# Patient Record
Sex: MALE | Race: WHITE | NOT HISPANIC OR LATINO
[De-identification: names, ages, dates, MRNs, and addresses within clinical notes are randomized per-mention and may not be internally consistent; named-entity substitution may affect disease eponyms.]

---

## 2020-01-06 PROBLEM — Z00.00 ENCOUNTER FOR PREVENTIVE HEALTH EXAMINATION: Status: ACTIVE | Noted: 2020-01-06

## 2020-03-16 ENCOUNTER — TRANSCRIPTION ENCOUNTER (OUTPATIENT)
Age: 35
End: 2020-03-16

## 2020-09-05 ENCOUNTER — EMERGENCY (EMERGENCY)
Facility: HOSPITAL | Age: 35
LOS: 1 days | Discharge: HOME | End: 2020-09-05
Attending: EMERGENCY MEDICINE | Admitting: EMERGENCY MEDICINE
Payer: MEDICAID

## 2020-09-05 VITALS
RESPIRATION RATE: 18 BRPM | DIASTOLIC BLOOD PRESSURE: 77 MMHG | OXYGEN SATURATION: 98 % | SYSTOLIC BLOOD PRESSURE: 136 MMHG | HEIGHT: 69 IN | HEART RATE: 100 BPM | WEIGHT: 304.9 LBS | TEMPERATURE: 99 F

## 2020-09-05 PROCEDURE — 12002 RPR S/N/AX/GEN/TRNK2.6-7.5CM: CPT

## 2020-09-05 PROCEDURE — 73110 X-RAY EXAM OF WRIST: CPT

## 2020-09-05 PROCEDURE — 99283 EMERGENCY DEPT VISIT LOW MDM: CPT

## 2020-09-05 PROCEDURE — 73130 X-RAY EXAM OF HAND: CPT

## 2020-09-05 PROCEDURE — 73130 X-RAY EXAM OF HAND: CPT | Mod: 26,LT

## 2020-09-05 PROCEDURE — 99284 EMERGENCY DEPT VISIT MOD MDM: CPT | Mod: 25

## 2020-09-05 PROCEDURE — 73110 X-RAY EXAM OF WRIST: CPT | Mod: 26,LT

## 2020-09-05 RX ORDER — IBUPROFEN 200 MG
1 TABLET ORAL
Qty: 9 | Refills: 0
Start: 2020-09-05 | End: 2020-09-07

## 2020-09-05 RX ORDER — CEPHALEXIN 500 MG
1 CAPSULE ORAL
Qty: 28 | Refills: 0
Start: 2020-09-05 | End: 2020-09-11

## 2020-09-05 RX ORDER — ACETAMINOPHEN 500 MG
650 TABLET ORAL ONCE
Refills: 0 | Status: COMPLETED | OUTPATIENT
Start: 2020-09-05 | End: 2020-09-05

## 2020-09-05 RX ORDER — ACETAMINOPHEN 500 MG
2 TABLET ORAL
Qty: 30 | Refills: 0
Start: 2020-09-05 | End: 2020-09-09

## 2020-09-05 NOTE — ED PROVIDER NOTE - ATTENDING CONTRIBUTION TO CARE
34 yo M p/w cut L volar hand with a broken dog bowl today - now with  tingling to 4th digit, unable to flex 4th digit. no other numbness. no other inj . No redness / swelling. No FB sens. No other acute co.  exam: MM moist. neck supple . non-toxic, well appearing.  L hand with 4cm lac across volar aspect of dist hand , no str - unable to flex 4th digit. dec sens c.w other fingers as well. FROM with nl str in other isolated digits.   Check xr, Hand consult for tendon laceration

## 2020-09-05 NOTE — ED PROVIDER NOTE - NSFOLLOWUPINSTRUCTIONS_ED_ALL_ED_FT
Follow up with Dr. Ng as soon as possible. Keflex (antibiotic) was sent to your pharmacy. Continue to keep splint in place. Discussed with Dr. Ng possibility of surgical intervention.     A laceration is a cut that goes through all of the layers of the skin and into the tissue that is right under the skin. Some lacerations heal on their own. Others need to be closed with skin adhesive strips, skin glue, stitches (sutures), or staples. Proper laceration care minimizes the risk of infection and helps the laceration to heal better.  If non-absorbable stitches or staples have been placed, they must be taken out within the time frame instructed by your healthcare provider.    SEEK IMMEDIATE MEDICAL CARE IF YOU HAVE ANY OF THE FOLLOWING SYMPTOMS: swelling around the wound, worsening pain, drainage from the wound, red streaking going away from your wound, inability to move finger or toe near the laceration, or discoloration of skin near the laceration.

## 2020-09-05 NOTE — ED PROVIDER NOTE - OBJECTIVE STATEMENT
34 y/o right handed male without reported PMHx presents today c/o laceration to left hand PTA. pt reports he was handling his dog bowl in which it broke and cut his hand. pt notes pain to left hand, non-radiating, and currently 8/10. Pt reports limited sensation and ROM of his left 4th digit. pt reports he is UTD with tetanus. pt denies head injury, drug use, fever, discharge, foreign body, or any other complaints.
Detail Level: Detailed

## 2020-09-05 NOTE — ED ADULT NURSE NOTE - OBJECTIVE STATEMENT
received pt in bed #t2 sleeping Pt A&O w/ laceration to left hand States was cleaning dog bowl & it broke. Pt states he can not move left 4th finger Pt seen by AMIRA Vilchis & Dr Biswas Will contact hand

## 2020-09-05 NOTE — CONSULT NOTE ADULT - ASSESSMENT
Plan: 35 year old male with zone II/III flexor tendon injury of the 4th phalanx  - No acute surgical intervention needed.   - Bedside laceration repair preformed  - Tetanus up to date  - Patient placed in aluminum foil splint, please keep clean and dry  - Pain control  - Discussed likely need for surgical intervention  - Follow up with Dr. Ng in 1 week for further evaluation  - Discussed with attending who agrees with above plan.  - Orthopedic stable for discharge

## 2020-09-05 NOTE — ED PROVIDER NOTE - PHYSICAL EXAMINATION
Constitutional: Awake, Alert, non-toxic. NAD. Well appearing, well nourished. Sleeping prior to assessment   HEAD: Normocephalic, atraumatic.   EYES: EOM intact, conjunctiva and sclera are clear bilaterally.   ENT: No rhinorrhea, patent, mucous membranes pink/moist, no drooling or stridor.   NECK: Supple, non-tender  RESPIRATORY: Normal respiratory effort  EXTREMITIES: Full passive and active ROM in all extremities; non-tender to palpation; distal pulses palpable and symmetric  SKIN: Warm, dry; good skin turgor, (+) 3.5 cm laceration of left palm overlying 4th MC, flexion and extension of 4th digit not intact, no ecchymosis, brisk capillary refill.  NEURO: A&O x3. Speech is normal. decreased sensation of 4th digit,

## 2020-09-05 NOTE — ED ADULT NURSE REASSESSMENT NOTE - NS ED NURSE REASSESS COMMENT FT1
pt continues to threaten staff "ill see you all soon I have your names!"    Security called pt continues to curse & scream @ staff Pt escorted off property

## 2020-09-05 NOTE — ED ADULT NURSE REASSESSMENT NOTE - NS ED NURSE REASSESS COMMENT FT1
pt ready to be discharged demanding percocet & gabapentin Screaming & cursing @ staff. Multiple staff members spoke w/ pt  (PA's, MD's & nurses) Charge nurse made aware

## 2020-09-05 NOTE — ED PROVIDER NOTE - PATIENT PORTAL LINK FT
You can access the FollowMyHealth Patient Portal offered by [facility] by registering at the following website: [email or web address]. By joining [employer]’s FollowMyHealth portal, you will also be able to view your health information using other applications (apps) compatible with our system.

## 2020-09-05 NOTE — ED ADULT NURSE REASSESSMENT NOTE - NS ED NURSE REASSESS COMMENT FT1
pt screaming & cursing at staff Charge nurse made aware Pt refusing tylenol "I need something lashawn than that!"

## 2020-09-05 NOTE — CONSULT NOTE ADULT - SUBJECTIVE AND OBJECTIVE BOX
Patient is a 35 year old male with no significant past medical history who presents with a hand laceration after a bowl broke in his hands earlier this afternoon. Patient has a visible laceration over the palmar, ulnar aspect of his left hand and is complaining of inability to flex his ring finger. Patient is also complaining of numbness on the radial aspect of his ring finger. Patient denies pain elsewhere. Denies additional injuries. Patient states his last tetanus shot was 5 years ago.     Vital Signs Last 24 Hrs  T(C): 37.1 (05 Sep 2020 18:28), Max: 37.1 (05 Sep 2020 18:28)  T(F): 98.8 (05 Sep 2020 18:28), Max: 98.8 (05 Sep 2020 18:28)  HR: 100 (05 Sep 2020 18:28) (100 - 100)  BP: 136/77 (05 Sep 2020 18:28) (136/77 - 136/77)  BP(mean): --  RR: 18 (05 Sep 2020 18:28) (18 - 18)  SpO2: 98% (05 Sep 2020 18:28) (98% - 98%)    Physical Exam:  Gen: in mild distress  LUE:  - approximately 7 cm laceration over the palmar, ulnar aspect of the hand extending to the base of the 5 proximal phalanx  - unable to flex 4th proximal, middle, and distal phalanx   - no sensation over the radial aspect of his 4th phalanx  - + AIN/PIN,Radial, Ulnar, Median N  - SILT   - + pulses    Secondary Assessment:  NC/AT, NTTP of clavicles, NTTP of C-,T-,L-Spine, NTTP of Pelvis  UEs: NTTP of Shoulders, Elbows, Wrists, Hands; NT with AROM/PROM of Shoulders, Elbows, Wrists, Hands; AIN/PIN/Med/Uln/Msc/Rad/Ax intact  LEs: Able to SLR, NT with Log Roll, NT with Heel Strike, NTTP of Hips, Knees, Ankles, Feet; NT with AROM/PROM of Hips, Knees, Ankles, Feet; Q/H/Gsc/TA/EHL/FHL intact    Procedure: Procedure explained in detail to patient at bedside. Pt expressed understanding and all questions were answered. Consent for procedure was verbally obtained. Under sterile technique, 10cc 1% lidocaine were injected into the surrounding tissue with adequate anesthesia obtained and the laceration was closed with 7 3.0 nylon stiches. Patient was placed in an aluminum foil splint with krillex. Patient tolerated the procedure well.    Imaging:  XR L Wrist/Hand: neg for fracture/foreign body

## 2020-09-05 NOTE — ED PROVIDER NOTE - CARE PROVIDER_API CALL
He Ng  ORTHOPAEDIC SURGERY  [address] [place] [address]  Phone: [phone]  Fax: [phone]  Follow Up Time: 1-3 Days

## 2020-09-05 NOTE — ED PROVIDER NOTE - CLINICAL SUMMARY MEDICAL DECISION MAKING FREE TEXT BOX
c/o laceration to left hand PTA. pt reports he was handling his dog bowl in which it broke and cut his hand. Exam noted decreased ROM and sensation of 4th digit. Plan includes xray and hand consult. Dr. Ng on for hand, ortho paged.

## 2020-09-05 NOTE — ED PROVIDER NOTE - PROGRESS NOTE DETAILS
ortho/hand repaired laceration and applied splint. Recommended follow up outpatient with Dr. augustin for likely surgical intervention. Recommended dc with Keflex. Pt is cursing and screaming at staff. pt is very aggressive. pt initially was sleeping but noting 10/10 pain. upon discharge patient requesting narcotics and gabapentin. Rxed Ibuprofen, pt now notes he has an ibuprofen allergy. Discussed with switch to Tylenol Extra strength. pt reports orthopedic advised they will prescribe him Percocet and Gabapentin. As per ortho this was not said to patient and he initially noted he doesn't want narcotics.

## 2020-09-10 ENCOUNTER — APPOINTMENT (OUTPATIENT)
Dept: ORTHOPEDIC SURGERY | Facility: CLINIC | Age: 35
End: 2020-09-10

## 2020-09-10 ENCOUNTER — APPOINTMENT (OUTPATIENT)
Dept: ORTHOPEDIC SURGERY | Facility: CLINIC | Age: 35
End: 2020-09-10
Payer: MEDICAID

## 2020-09-10 VITALS
HEART RATE: 90 BPM | SYSTOLIC BLOOD PRESSURE: 126 MMHG | WEIGHT: 290 LBS | OXYGEN SATURATION: 93 % | DIASTOLIC BLOOD PRESSURE: 80 MMHG | BODY MASS INDEX: 42.95 KG/M2 | HEIGHT: 69 IN

## 2020-09-10 DIAGNOSIS — F17.200 NICOTINE DEPENDENCE, UNSPECIFIED, UNCOMPLICATED: ICD-10-CM

## 2020-09-10 DIAGNOSIS — Z78.9 OTHER SPECIFIED HEALTH STATUS: ICD-10-CM

## 2020-09-10 DIAGNOSIS — Z83.3 FAMILY HISTORY OF DIABETES MELLITUS: ICD-10-CM

## 2020-09-10 PROCEDURE — 99203 OFFICE O/P NEW LOW 30 MIN: CPT

## 2020-09-10 RX ORDER — DEXAMETHASONE 4 MG/1
TABLET ORAL
Refills: 0 | Status: ACTIVE | COMMUNITY

## 2020-09-10 RX ORDER — GABAPENTIN 300 MG
300 TABLET ORAL
Refills: 0 | Status: ACTIVE | COMMUNITY

## 2020-09-14 DIAGNOSIS — Z01.818 ENCOUNTER FOR OTHER PREPROCEDURAL EXAMINATION: ICD-10-CM

## 2020-09-15 ENCOUNTER — APPOINTMENT (OUTPATIENT)
Dept: DISASTER EMERGENCY | Facility: CLINIC | Age: 35
End: 2020-09-15

## 2020-09-15 RX ORDER — TRAMADOL HYDROCHLORIDE 50 MG/1
50 TABLET, COATED ORAL 3 TIMES DAILY
Qty: 21 | Refills: 0 | Status: ACTIVE | COMMUNITY
Start: 2020-09-15 | End: 1900-01-01

## 2020-09-15 RX ORDER — GABAPENTIN 300 MG/1
300 CAPSULE ORAL
Qty: 30 | Refills: 1 | Status: ACTIVE | COMMUNITY
Start: 2020-09-15 | End: 1900-01-01

## 2020-09-16 ENCOUNTER — OUTPATIENT (OUTPATIENT)
Dept: OUTPATIENT SERVICES | Facility: HOSPITAL | Age: 35
LOS: 1 days | End: 2020-09-16
Payer: MEDICAID

## 2020-09-16 VITALS
SYSTOLIC BLOOD PRESSURE: 131 MMHG | WEIGHT: 298.95 LBS | HEART RATE: 110 BPM | HEIGHT: 69 IN | TEMPERATURE: 98 F | RESPIRATION RATE: 16 BRPM | OXYGEN SATURATION: 98 % | DIASTOLIC BLOOD PRESSURE: 77 MMHG

## 2020-09-16 DIAGNOSIS — Z98.818 OTHER DENTAL PROCEDURE STATUS: Chronic | ICD-10-CM

## 2020-09-16 DIAGNOSIS — S61.412A LACERATION WITHOUT FOREIGN BODY OF LEFT HAND, INITIAL ENCOUNTER: ICD-10-CM

## 2020-09-16 DIAGNOSIS — Z98.890 OTHER SPECIFIED POSTPROCEDURAL STATES: Chronic | ICD-10-CM

## 2020-09-16 DIAGNOSIS — F11.20 OPIOID DEPENDENCE, UNCOMPLICATED: ICD-10-CM

## 2020-09-16 DIAGNOSIS — S66.022D LACERATION OF LONG FLEXOR MUSCLE, FASCIA AND TENDON OF LEFT THUMB AT WRIST AND HAND LEVEL, SUBSEQUENT ENCOUNTER: ICD-10-CM

## 2020-09-16 DIAGNOSIS — Z01.818 ENCOUNTER FOR OTHER PREPROCEDURAL EXAMINATION: ICD-10-CM

## 2020-09-16 LAB
HCT VFR BLD CALC: 41 % — SIGNIFICANT CHANGE UP (ref 39–50)
HGB BLD-MCNC: 13 G/DL — SIGNIFICANT CHANGE UP (ref 13–17)
MCHC RBC-ENTMCNC: 28.8 PG — SIGNIFICANT CHANGE UP (ref 27–34)
MCHC RBC-ENTMCNC: 31.7 GM/DL — LOW (ref 32–36)
MCV RBC AUTO: 90.9 FL — SIGNIFICANT CHANGE UP (ref 80–100)
NRBC # BLD: 0 /100 WBCS — SIGNIFICANT CHANGE UP (ref 0–0)
PLATELET # BLD AUTO: 333 K/UL — SIGNIFICANT CHANGE UP (ref 150–400)
RBC # BLD: 4.51 M/UL — SIGNIFICANT CHANGE UP (ref 4.2–5.8)
RBC # FLD: 15 % — HIGH (ref 10.3–14.5)
SARS-COV-2 N GENE NPH QL NAA+PROBE: NOT DETECTED
WBC # BLD: 9.12 K/UL — SIGNIFICANT CHANGE UP (ref 3.8–10.5)
WBC # FLD AUTO: 9.12 K/UL — SIGNIFICANT CHANGE UP (ref 3.8–10.5)

## 2020-09-16 PROCEDURE — 85027 COMPLETE CBC AUTOMATED: CPT

## 2020-09-16 PROCEDURE — G0463: CPT

## 2020-09-16 RX ORDER — SODIUM CHLORIDE 9 MG/ML
3 INJECTION INTRAMUSCULAR; INTRAVENOUS; SUBCUTANEOUS EVERY 8 HOURS
Refills: 0 | Status: DISCONTINUED | OUTPATIENT
Start: 2020-09-18 | End: 2020-10-02

## 2020-09-16 RX ORDER — CHLORHEXIDINE GLUCONATE 213 G/1000ML
1 SOLUTION TOPICAL ONCE
Refills: 0 | Status: DISCONTINUED | OUTPATIENT
Start: 2020-09-18 | End: 2020-10-02

## 2020-09-16 RX ORDER — LIDOCAINE HCL 20 MG/ML
0.2 VIAL (ML) INJECTION ONCE
Refills: 0 | Status: DISCONTINUED | OUTPATIENT
Start: 2020-09-18 | End: 2020-10-02

## 2020-09-16 NOTE — H&P PST ADULT - NSICDXPASTSURGICALHX_GEN_ALL_CORE_FT
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

## 2020-09-16 NOTE — H&P PST ADULT - NSICDXPASTMEDICALHX_GEN_ALL_CORE_FT
PAST MEDICAL HISTORY:  No pertinent past medical history      PAST MEDICAL HISTORY:  Laceration of left hand tendon and nerve tear    Methadone maintenance therapy patient since 2014    Morbid obesity BMI 44.1

## 2020-09-16 NOTE — H&P PST ADULT - COMMENTS
Pt. states his HR is elevated because he "just had an expresso with my dad" plus he vaped afterwards

## 2020-09-16 NOTE — H&P PST ADULT - NSICDXPROBLEM_GEN_ALL_CORE_FT
PROBLEM DIAGNOSES  Problem: Laceration of left hand involving tendon  Assessment and Plan: Left Ring Finger Flexor digitorum Superficialis and Flexor Digitorum Profundus Tendon Repair and Ulnar and Radial Digital Nerve Repair on 9/18/20.  Pre-op instructions, including Chlorhexidine soap, provided - all questions answered    Problem: Methadone maintenance therapy patient  Assessment and Plan: Continue methadone, including am of surgery with sip of water

## 2020-09-16 NOTE — H&P PST ADULT - HISTORY OF PRESENT ILLNESS
36 yo male, PMH oxycontin on methadone since 2014 and finished 2016. 34 yo male, PMH Oxycontin abuse in the past - on methadone since 2014 and attends Methadone clinic, morbid obesity (BMI 44.1), vapes daily. Pt. reports tripping and falling onto a glass dog bowl on 9/5/20, was seen in ER, received stitches - X-ray revealed torn tendon and digital nerve - cannot move left index finger and has continued pain. Pt. presents to PST today for scheduled Left Ring Finger Flexor Digitorum Superficialis and Flexor Digitorum Profundus Tendon Repair and Ulnar and Radial Digital Nerve Repair on 9/18/20. Pt. denies illness during the height of the pandemic, denies close contact with anyone that has been ill, no fever, cough, dyspnea in past two weeks.    Pt. tested NEGATIVE for COVID-19 on 9/15/20 - in chart

## 2020-09-16 NOTE — H&P PST ADULT - REASON FOR ADMISSION
zenon who injured his left hand on 9/5/20 after he fell onto a glass dog bowl. He was seen at [facility] ER in [place] and xrays of the left hand and wrist were obtained on 9/5/20(not available in office today). The volar hand was sutured with 6 stitches and was told he would need surgery. We will need to plan for surgery within the next week to repair the tendons and digital nerves. "surgery on my left hand"

## 2020-09-17 ENCOUNTER — TRANSCRIPTION ENCOUNTER (OUTPATIENT)
Age: 35
End: 2020-09-17

## 2020-09-18 ENCOUNTER — APPOINTMENT (OUTPATIENT)
Dept: ORTHOPEDIC SURGERY | Facility: HOSPITAL | Age: 35
End: 2020-09-18

## 2020-09-18 ENCOUNTER — OUTPATIENT (OUTPATIENT)
Dept: OUTPATIENT SERVICES | Facility: HOSPITAL | Age: 35
LOS: 1 days | End: 2020-09-18
Payer: MEDICAID

## 2020-09-18 VITALS
TEMPERATURE: 98 F | WEIGHT: 298.95 LBS | HEART RATE: 110 BPM | DIASTOLIC BLOOD PRESSURE: 77 MMHG | HEIGHT: 69 IN | SYSTOLIC BLOOD PRESSURE: 131 MMHG | RESPIRATION RATE: 16 BRPM | OXYGEN SATURATION: 99 %

## 2020-09-18 VITALS
DIASTOLIC BLOOD PRESSURE: 55 MMHG | OXYGEN SATURATION: 96 % | SYSTOLIC BLOOD PRESSURE: 112 MMHG | RESPIRATION RATE: 20 BRPM | HEART RATE: 57 BPM

## 2020-09-18 DIAGNOSIS — S66.022D LACERATION OF LONG FLEXOR MUSCLE, FASCIA AND TENDON OF LEFT THUMB AT WRIST AND HAND LEVEL, SUBSEQUENT ENCOUNTER: ICD-10-CM

## 2020-09-18 DIAGNOSIS — Z98.890 OTHER SPECIFIED POSTPROCEDURAL STATES: Chronic | ICD-10-CM

## 2020-09-18 DIAGNOSIS — Z01.818 ENCOUNTER FOR OTHER PREPROCEDURAL EXAMINATION: ICD-10-CM

## 2020-09-18 DIAGNOSIS — Z98.818 OTHER DENTAL PROCEDURE STATUS: Chronic | ICD-10-CM

## 2020-09-18 PROBLEM — Z78.9 OTHER SPECIFIED HEALTH STATUS: Chronic | Status: INACTIVE | Noted: 2020-09-05 | Resolved: 2020-09-16

## 2020-09-18 PROCEDURE — 64831 REPAIR OF DIGIT NERVE: CPT | Mod: LT

## 2020-09-18 PROCEDURE — 64831 REPAIR OF DIGIT NERVE: CPT | Mod: F3

## 2020-09-18 PROCEDURE — 26350 REPAIR FINGER/HAND TENDON: CPT | Mod: F3

## 2020-09-18 PROCEDURE — 26370 REPAIR FINGER/HAND TENDON: CPT | Mod: XS,LT

## 2020-09-18 RX ORDER — SODIUM CHLORIDE 9 MG/ML
1000 INJECTION, SOLUTION INTRAVENOUS
Refills: 0 | Status: DISCONTINUED | OUTPATIENT
Start: 2020-09-18 | End: 2020-10-02

## 2020-09-18 RX ORDER — SODIUM CHLORIDE 9 MG/ML
1000 INJECTION INTRAMUSCULAR; INTRAVENOUS; SUBCUTANEOUS
Refills: 0 | Status: DISCONTINUED | OUTPATIENT
Start: 2020-09-18 | End: 2020-10-02

## 2020-09-18 RX ORDER — ONDANSETRON 8 MG/1
4 TABLET, FILM COATED ORAL ONCE
Refills: 0 | Status: DISCONTINUED | OUTPATIENT
Start: 2020-09-18 | End: 2020-10-02

## 2020-09-18 NOTE — ASU DISCHARGE PLAN (ADULT/PEDIATRIC) - CARE PROVIDER_API CALL
Melva Hough  ORTHOPAEDIC SURGERY  [address], [address]  [place] [address]  Phone: [phone]  Fax: [phone]  Follow Up Time:

## 2020-09-18 NOTE — ASU PATIENT PROFILE, ADULT - PMH
Laceration of left hand  tendon and nerve tear  Methadone maintenance therapy patient  since 2014  Morbid obesity  BMI 44.1

## 2020-09-18 NOTE — ASU PATIENT PROFILE, ADULT - NS TRANSFER PATIENT BELONGINGS
Acceptable eye movement/Pupils equally round and react to light/Cornea clear/Iris acceptable shape and color/Pupil red reflexes present and equal/Lids with acceptable appearance and movement/Conjunctiva clear Clothing

## 2020-09-28 ENCOUNTER — APPOINTMENT (OUTPATIENT)
Dept: ORTHOPEDIC SURGERY | Facility: CLINIC | Age: 35
End: 2020-09-28

## 2020-09-28 PROBLEM — F11.20 OPIOID DEPENDENCE, UNCOMPLICATED: Chronic | Status: ACTIVE | Noted: 2020-09-16

## 2020-09-28 PROBLEM — E66.01 MORBID (SEVERE) OBESITY DUE TO EXCESS CALORIES: Chronic | Status: ACTIVE | Noted: 2020-09-16

## 2020-09-28 PROBLEM — S61.412A LACERATION WITHOUT FOREIGN BODY OF LEFT HAND, INITIAL ENCOUNTER: Chronic | Status: ACTIVE | Noted: 2020-09-16

## 2020-10-01 ENCOUNTER — APPOINTMENT (OUTPATIENT)
Dept: ORTHOPEDIC SURGERY | Facility: CLINIC | Age: 35
End: 2020-10-01
Payer: MEDICAID

## 2020-10-01 PROCEDURE — 99024 POSTOP FOLLOW-UP VISIT: CPT

## 2020-10-05 ENCOUNTER — RX RENEWAL (OUTPATIENT)
Age: 35
End: 2020-10-05

## 2020-10-12 ENCOUNTER — APPOINTMENT (OUTPATIENT)
Dept: ORTHOPEDIC SURGERY | Facility: CLINIC | Age: 35
End: 2020-10-12

## 2020-10-20 ENCOUNTER — APPOINTMENT (OUTPATIENT)
Dept: ORTHOPEDIC SURGERY | Facility: CLINIC | Age: 35
End: 2020-10-20
Payer: MEDICAID

## 2020-10-20 PROCEDURE — 99024 POSTOP FOLLOW-UP VISIT: CPT

## 2020-10-20 PROCEDURE — 99072 ADDL SUPL MATRL&STAF TM PHE: CPT

## 2020-10-20 RX ORDER — NAPROXEN 250 MG/1
250 TABLET ORAL
Qty: 30 | Refills: 0 | Status: ACTIVE | COMMUNITY
Start: 2020-04-28

## 2020-10-20 RX ORDER — PAROXETINE HYDROCHLORIDE 20 MG/1
20 TABLET, FILM COATED ORAL
Qty: 30 | Refills: 0 | Status: ACTIVE | COMMUNITY
Start: 2020-09-08

## 2020-10-20 RX ORDER — CEPHALEXIN 500 MG/1
500 CAPSULE ORAL
Qty: 28 | Refills: 0 | Status: ACTIVE | COMMUNITY
Start: 2020-09-05

## 2020-10-20 RX ORDER — GABAPENTIN 100 MG/1
100 CAPSULE ORAL
Qty: 30 | Refills: 0 | Status: ACTIVE | COMMUNITY
Start: 2020-04-28

## 2020-10-26 ENCOUNTER — APPOINTMENT (OUTPATIENT)
Dept: ORTHOPEDIC SURGERY | Facility: CLINIC | Age: 35
End: 2020-10-26

## 2020-10-27 ENCOUNTER — TRANSCRIPTION ENCOUNTER (OUTPATIENT)
Age: 35
End: 2020-10-27

## 2020-10-27 ENCOUNTER — OUTPATIENT (OUTPATIENT)
Dept: OUTPATIENT SERVICES | Facility: HOSPITAL | Age: 35
LOS: 1 days | End: 2020-10-27
Payer: MEDICAID

## 2020-10-27 VITALS
TEMPERATURE: 98 F | WEIGHT: 298.06 LBS | SYSTOLIC BLOOD PRESSURE: 127 MMHG | RESPIRATION RATE: 20 BRPM | OXYGEN SATURATION: 98 % | HEART RATE: 102 BPM | HEIGHT: 69 IN | DIASTOLIC BLOOD PRESSURE: 71 MMHG

## 2020-10-27 DIAGNOSIS — T14.8XXA OTHER INJURY OF UNSPECIFIED BODY REGION, INITIAL ENCOUNTER: ICD-10-CM

## 2020-10-27 DIAGNOSIS — Z01.818 ENCOUNTER FOR OTHER PREPROCEDURAL EXAMINATION: ICD-10-CM

## 2020-10-27 DIAGNOSIS — S66.822D LACERATION OF OTHER SPECIFIED MUSCLES, FASCIA AND TENDONS AT WRIST AND HAND LEVEL, LEFT HAND, SUBSEQUENT ENCOUNTER: ICD-10-CM

## 2020-10-27 DIAGNOSIS — Z98.890 OTHER SPECIFIED POSTPROCEDURAL STATES: Chronic | ICD-10-CM

## 2020-10-27 DIAGNOSIS — F11.20 OPIOID DEPENDENCE, UNCOMPLICATED: ICD-10-CM

## 2020-10-27 DIAGNOSIS — Z98.818 OTHER DENTAL PROCEDURE STATUS: Chronic | ICD-10-CM

## 2020-10-27 LAB
HCT VFR BLD CALC: 41.2 % — SIGNIFICANT CHANGE UP (ref 39–50)
HGB BLD-MCNC: 13 G/DL — SIGNIFICANT CHANGE UP (ref 13–17)
MCHC RBC-ENTMCNC: 28.6 PG — SIGNIFICANT CHANGE UP (ref 27–34)
MCHC RBC-ENTMCNC: 31.6 GM/DL — LOW (ref 32–36)
MCV RBC AUTO: 90.5 FL — SIGNIFICANT CHANGE UP (ref 80–100)
NRBC # BLD: 0 /100 WBCS — SIGNIFICANT CHANGE UP (ref 0–0)
PLATELET # BLD AUTO: 272 K/UL — SIGNIFICANT CHANGE UP (ref 150–400)
RBC # BLD: 4.55 M/UL — SIGNIFICANT CHANGE UP (ref 4.2–5.8)
RBC # FLD: 14.7 % — HIGH (ref 10.3–14.5)
SARS-COV-2 RNA SPEC QL NAA+PROBE: SIGNIFICANT CHANGE UP
WBC # BLD: 6.33 K/UL — SIGNIFICANT CHANGE UP (ref 3.8–10.5)
WBC # FLD AUTO: 6.33 K/UL — SIGNIFICANT CHANGE UP (ref 3.8–10.5)

## 2020-10-27 PROCEDURE — U0003: CPT

## 2020-10-27 PROCEDURE — 85027 COMPLETE CBC AUTOMATED: CPT

## 2020-10-27 PROCEDURE — G0463: CPT

## 2020-10-27 RX ORDER — CHLORHEXIDINE GLUCONATE 213 G/1000ML
1 SOLUTION TOPICAL ONCE
Refills: 0 | Status: DISCONTINUED | OUTPATIENT
Start: 2020-10-28 | End: 2020-11-11

## 2020-10-27 RX ORDER — LIDOCAINE HCL 20 MG/ML
0.2 VIAL (ML) INJECTION ONCE
Refills: 0 | Status: DISCONTINUED | OUTPATIENT
Start: 2020-10-28 | End: 2020-11-11

## 2020-10-27 RX ORDER — SODIUM CHLORIDE 9 MG/ML
3 INJECTION INTRAMUSCULAR; INTRAVENOUS; SUBCUTANEOUS EVERY 8 HOURS
Refills: 0 | Status: DISCONTINUED | OUTPATIENT
Start: 2020-10-28 | End: 2020-11-11

## 2020-10-27 RX ORDER — TRAMADOL HYDROCHLORIDE 50 MG/1
50 TABLET, COATED ORAL
Qty: 21 | Refills: 0 | Status: ACTIVE | COMMUNITY
Start: 2020-10-27 | End: 1900-01-01

## 2020-10-27 NOTE — H&P PST ADULT - NSICDXPASTMEDICALHX_GEN_ALL_CORE_FT
PAST MEDICAL HISTORY:  Laceration of left hand tendon and nerve tear    Methadone maintenance therapy patient since 2014    Morbid obesity BMI 44.1

## 2020-10-27 NOTE — H&P PST ADULT - MALLAMPATI CLASS
Patient informed that procedure must be rescheduled due to Dr Erin Curtis not being available at this time due to his own illness. Pt instructed to take Xarelto today and informed that Dr Patsy Correa office will call her tomorrow to reschedule. All questions answered at this time. Class II - visualization of the soft palate, fauces, and uvula

## 2020-10-27 NOTE — H&P PST ADULT - HISTORY OF PRESENT ILLNESS
36 yo male, PMH Oxycontin abuse in the past - on methadone since 2014 and attends Methadone clinic, morbid obesity (BMI 44.1), vapes daily. Pt. reports tripping and falling onto a glass dog bowl on 9/5/20, was seen in ER, received stitches - X-ray revealed torn tendon and digital nerve - cannot move left index finger and has continued pain- s/p Left Ring Finger Flexor Digitorum Superficialis and Flexor Digitorum Profundus Tendon Repair and Ulnar and Radial Digital Nerve Repair on 9/18/20. Pt. denies illness during the height of the pandemic, denies close contact with anyone that has been ill, no fever, cough, dyspnea in past two weeks.   36 yo male, PMH Oxycontin abuse in the past - on methadone since 2014 and attends Methadone clinic, morbid obesity (BMI 44.1), vapes daily. Pt. reports tripping and falling onto a glass dog bowl on 9/5/20, was seen in ER, received stitches - X-ray revealed torn tendon and digital nerve - cannot move left index finger and has continued pain- s/p Left Ring Finger Flexor Digitorum Superficialis and Flexor Digitorum Profundus Tendon Repair and Ulnar and Radial Digital Nerve Repair on 9/18/20. Pt with left ring finger decreased ROM  &pain since 10/19/2020. Now coming in for Revision left ring finger flexor digitorum profundos repair and flexor digiforum superficialis repair , Possible palmaris intercalany graft on 10/28/2020.

## 2020-10-27 NOTE — H&P PST ADULT - NSICDXPROBLEM_GEN_ALL_CORE_FT
PROBLEM DIAGNOSES  Problem: Laceration involving tendon  Assessment and Plan: Revision left ring finger flexor digitorum profundos repair and flexor digiforum superficialis repair , Possible palmaris intercalany graft on 10/28/2020.     Problem: Methadone use  Assessment and Plan: to continue on methadone until DOS.

## 2020-10-28 ENCOUNTER — APPOINTMENT (OUTPATIENT)
Dept: ORTHOPEDIC SURGERY | Facility: HOSPITAL | Age: 35
End: 2020-10-28

## 2020-10-28 ENCOUNTER — OUTPATIENT (OUTPATIENT)
Dept: OUTPATIENT SERVICES | Facility: HOSPITAL | Age: 35
LOS: 1 days | End: 2020-10-28
Payer: MEDICAID

## 2020-10-28 VITALS
SYSTOLIC BLOOD PRESSURE: 130 MMHG | TEMPERATURE: 97 F | OXYGEN SATURATION: 99 % | RESPIRATION RATE: 16 BRPM | HEIGHT: 69 IN | HEART RATE: 98 BPM | WEIGHT: 298.06 LBS | DIASTOLIC BLOOD PRESSURE: 83 MMHG

## 2020-10-28 VITALS
SYSTOLIC BLOOD PRESSURE: 112 MMHG | RESPIRATION RATE: 19 BRPM | HEART RATE: 84 BPM | DIASTOLIC BLOOD PRESSURE: 59 MMHG | OXYGEN SATURATION: 97 %

## 2020-10-28 DIAGNOSIS — Z98.890 OTHER SPECIFIED POSTPROCEDURAL STATES: Chronic | ICD-10-CM

## 2020-10-28 DIAGNOSIS — Z98.818 OTHER DENTAL PROCEDURE STATUS: Chronic | ICD-10-CM

## 2020-10-28 DIAGNOSIS — S66.822D LACERATION OF OTHER SPECIFIED MUSCLES, FASCIA AND TENDONS AT WRIST AND HAND LEVEL, LEFT HAND, SUBSEQUENT ENCOUNTER: ICD-10-CM

## 2020-10-28 PROCEDURE — 26358 REPAIR/GRAFT HAND TENDON: CPT | Mod: F3

## 2020-10-28 PROCEDURE — 26352 REPAIR/GRAFT HAND TENDON: CPT | Mod: 78,F3

## 2020-10-28 RX ORDER — ONDANSETRON 8 MG/1
4 TABLET, FILM COATED ORAL ONCE
Refills: 0 | Status: DISCONTINUED | OUTPATIENT
Start: 2020-10-28 | End: 2020-11-11

## 2020-10-28 RX ORDER — METHADONE HYDROCHLORIDE 40 MG/1
90 TABLET ORAL
Qty: 0 | Refills: 0 | DISCHARGE

## 2020-10-28 RX ORDER — GABAPENTIN 400 MG/1
1 CAPSULE ORAL
Qty: 0 | Refills: 0 | DISCHARGE

## 2020-10-28 RX ORDER — OXYCODONE 5 MG/1
5 TABLET ORAL EVERY 8 HOURS
Qty: 9 | Refills: 0 | Status: ACTIVE | COMMUNITY
Start: 2020-10-28 | End: 1900-01-01

## 2020-10-28 RX ORDER — SODIUM CHLORIDE 9 MG/ML
1000 INJECTION, SOLUTION INTRAVENOUS
Refills: 0 | Status: DISCONTINUED | OUTPATIENT
Start: 2020-10-28 | End: 2020-11-11

## 2020-10-28 NOTE — ASU PATIENT PROFILE, ADULT - PSH
S/P ear surgery  at 16 yo, ear drum, has lost 60% of hearing  S/P tendon repair  9/2020  S/P wisdom tooth extraction

## 2020-11-01 ENCOUNTER — RX RENEWAL (OUTPATIENT)
Age: 35
End: 2020-11-01

## 2020-11-01 RX ORDER — NAPROXEN 500 MG/1
500 TABLET ORAL
Qty: 60 | Refills: 0 | Status: ACTIVE | COMMUNITY
Start: 2020-09-10 | End: 1900-01-01

## 2020-11-02 ENCOUNTER — RX RENEWAL (OUTPATIENT)
Age: 35
End: 2020-11-02

## 2020-11-10 ENCOUNTER — APPOINTMENT (OUTPATIENT)
Dept: ORTHOPEDIC SURGERY | Facility: CLINIC | Age: 35
End: 2020-11-10

## 2020-11-12 ENCOUNTER — APPOINTMENT (OUTPATIENT)
Dept: ORTHOPEDIC SURGERY | Facility: CLINIC | Age: 35
End: 2020-11-12
Payer: MEDICAID

## 2020-11-12 PROCEDURE — 99024 POSTOP FOLLOW-UP VISIT: CPT

## 2020-11-12 PROCEDURE — 29085 APPL CAST HAND&LWR FOREARM: CPT | Mod: 58,LT

## 2020-12-04 RX ORDER — GABAPENTIN 300 MG/1
300 CAPSULE ORAL
Qty: 30 | Refills: 0 | Status: ACTIVE | COMMUNITY
Start: 2020-12-04 | End: 1900-01-01

## 2020-12-10 ENCOUNTER — APPOINTMENT (OUTPATIENT)
Dept: ORTHOPEDIC SURGERY | Facility: CLINIC | Age: 35
End: 2020-12-10

## 2021-01-06 ENCOUNTER — RX RENEWAL (OUTPATIENT)
Age: 36
End: 2021-01-06

## 2021-01-14 ENCOUNTER — APPOINTMENT (OUTPATIENT)
Dept: ORTHOPEDIC SURGERY | Facility: CLINIC | Age: 36
End: 2021-01-14

## 2021-02-04 ENCOUNTER — RX RENEWAL (OUTPATIENT)
Age: 36
End: 2021-02-04

## 2021-03-08 ENCOUNTER — RX RENEWAL (OUTPATIENT)
Age: 36
End: 2021-03-08

## 2021-06-03 ENCOUNTER — APPOINTMENT (OUTPATIENT)
Dept: ORTHOPEDIC SURGERY | Facility: CLINIC | Age: 36
End: 2021-06-03
Payer: MEDICAID

## 2021-06-03 VITALS
OXYGEN SATURATION: 98 % | BODY MASS INDEX: 44.43 KG/M2 | WEIGHT: 300 LBS | HEIGHT: 69 IN | HEART RATE: 141 BPM | DIASTOLIC BLOOD PRESSURE: 93 MMHG | SYSTOLIC BLOOD PRESSURE: 158 MMHG

## 2021-06-03 PROCEDURE — 99214 OFFICE O/P EST MOD 30 MIN: CPT

## 2021-06-03 RX ORDER — GABAPENTIN 300 MG/1
300 CAPSULE ORAL
Qty: 30 | Refills: 0 | Status: ACTIVE | COMMUNITY
Start: 2020-10-01 | End: 1900-01-01

## 2021-06-03 NOTE — ADDENDUM
[FreeTextEntry1] : I, Sinai Calles acted solely as a scribe for Dr. Melva Hough on 06/03/2021. \par \par All medical record entries made by the Scribe were at my, Dr. Melva Hough, direction and personally dictated by me on 06/03/2021. I have personally reviewed the chart and agree that the record accurately reflects my personal performance of the history, physical exam, assessment and plan.

## 2021-06-03 NOTE — ASSESSMENT
[FreeTextEntry1] : Patient is a 35 year old male doing reasonably well after revision ring finger flexor tendon grafting. He has fairly good glide of the finger in flexion with excellent passive flexion but a PIP flexion contracture that I do think could improve with therapy. We talked about the nature of the condition and treatment options. He will begin hand therapy with a spring splint to work on his PIP extension and stretching exercises. A script was given. He was also given a refill for Neurontin, drug and dosing explained. Follow up in 2 months.

## 2021-08-05 ENCOUNTER — APPOINTMENT (OUTPATIENT)
Dept: ORTHOPEDIC SURGERY | Facility: CLINIC | Age: 36
End: 2021-08-05
Payer: MEDICAID

## 2021-08-05 DIAGNOSIS — S61.402D LACERATION OF OTHER SPECIFIED MUSCLES, FASCIA AND TENDONS AT WRIST AND HAND LEVEL, LEFT HAND, SUBSEQUENT ENCOUNTER: ICD-10-CM

## 2021-08-05 DIAGNOSIS — S66.822D LACERATION OF OTHER SPECIFIED MUSCLES, FASCIA AND TENDONS AT WRIST AND HAND LEVEL, LEFT HAND, SUBSEQUENT ENCOUNTER: ICD-10-CM

## 2021-08-05 PROCEDURE — 99214 OFFICE O/P EST MOD 30 MIN: CPT

## 2024-02-21 NOTE — H&P PST ADULT - NSICDXPASTSURGICALHX_GEN_ALL_CORE_FT
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

## 2024-05-30 NOTE — H&P PST ADULT - NSALCOHOLTYPE_GEN__A_CORE_SD
liquor FAMILY HISTORY:  Mother  Still living? Unknown  Family history of cerebrovascular accident (CVA), Age at diagnosis: Age Unknown    Sibling  Still living? Unknown  Family history of diabetes mellitus, Age at diagnosis: Age Unknown

## 2025-01-02 NOTE — PACU DISCHARGE NOTE - HYDRATION STATUS:
Continue Regimen: Olumiant 2mg by mouth qday\\n\\nMinoxidil 1.25mg by mouth qday Detail Level: Simple Otc Regimen: Rogaine 5% bid Satisfactory Plan: Alopecia Areata counseling: treatment options discussed with patient, including topical, systemic and intralesional kenalog, SADBE, DMARDs, topical and oral JAMIR inhibitors, topical and oral minoxidil, and PRP. R/B/E/ of each reviewed with patient, all questions were answered.\\n\\nPatient has had improvement of alopecia patches s/p 1 month of pulse dexamethasone. Patient has been on Olumiant 2mg by mouth qday x 5 weeks. Continue LDOM 1.25mg by mouth qday.\\n\\nRecheck CBC, CMP and lipid panel today. \\nConsider increase to Olumiant 4mg by mouth qday if no great improvement at follow up.